# Patient Record
Sex: MALE | Race: WHITE | NOT HISPANIC OR LATINO | URBAN - METROPOLITAN AREA
[De-identification: names, ages, dates, MRNs, and addresses within clinical notes are randomized per-mention and may not be internally consistent; named-entity substitution may affect disease eponyms.]

---

## 2024-10-04 ENCOUNTER — HOSPITAL ENCOUNTER (OUTPATIENT)
Dept: RADIOLOGY | Facility: HOSPITAL | Age: 59
Discharge: HOME | End: 2024-10-04
Attending: OPHTHALMOLOGY
Payer: COMMERCIAL

## 2024-10-04 DIAGNOSIS — H33.311 HORSESHOE RETINAL TEAR OF RIGHT EYE: ICD-10-CM

## 2024-10-04 PROCEDURE — 71046 X-RAY EXAM CHEST 2 VIEWS: CPT | Mod: TC

## 2024-10-10 ENCOUNTER — ANESTHESIA EVENT (OUTPATIENT)
Dept: SURGERY | Facility: HOSPITAL | Age: 59
End: 2024-10-10
Payer: COMMERCIAL

## 2024-10-11 ENCOUNTER — ANESTHESIA (OUTPATIENT)
Dept: SURGERY | Facility: HOSPITAL | Age: 59
End: 2024-10-11
Payer: COMMERCIAL

## 2024-10-11 ENCOUNTER — HOSPITAL ENCOUNTER (OUTPATIENT)
Facility: HOSPITAL | Age: 59
Discharge: HOME | End: 2024-10-11
Attending: OPHTHALMOLOGY | Admitting: OPHTHALMOLOGY
Payer: COMMERCIAL

## 2024-10-11 DIAGNOSIS — H33.319: ICD-10-CM

## 2024-10-11 PROCEDURE — 27201423 OPTIME MED/SURG SUP & DEVICES STERILE SUPPLY: Performed by: OPHTHALMOLOGY

## 2024-10-11 PROCEDURE — 37000008 HC ANESTHESIA 1ST 15 MINUTES: Performed by: OPHTHALMOLOGY

## 2024-10-11 PROCEDURE — 71000015 HC POSTOP RECOV 1ST HR: Performed by: OPHTHALMOLOGY

## 2024-10-11 PROCEDURE — 71000033 HC RECOVERY, INTIAL HOUR: Performed by: OPHTHALMOLOGY

## 2024-10-11 PROCEDURE — 36000706: Performed by: OPHTHALMOLOGY

## 2024-10-11 PROCEDURE — 25000003 PHARM REV CODE 250

## 2024-10-11 PROCEDURE — 36000707: Performed by: OPHTHALMOLOGY

## 2024-10-11 PROCEDURE — 25000003 PHARM REV CODE 250: Performed by: NURSE ANESTHETIST, CERTIFIED REGISTERED

## 2024-10-11 PROCEDURE — 25000003 PHARM REV CODE 250: Performed by: OPHTHALMOLOGY

## 2024-10-11 PROCEDURE — 63600175 PHARM REV CODE 636 W HCPCS: Performed by: NURSE ANESTHETIST, CERTIFIED REGISTERED

## 2024-10-11 PROCEDURE — C1814 RETINAL TAMP, SILICONE OIL: HCPCS | Performed by: OPHTHALMOLOGY

## 2024-10-11 PROCEDURE — 63600175 PHARM REV CODE 636 W HCPCS: Performed by: OPHTHALMOLOGY

## 2024-10-11 PROCEDURE — C1784 OCULAR DEV, INTRAOP, DET RET: HCPCS | Performed by: OPHTHALMOLOGY

## 2024-10-11 PROCEDURE — 37000009 HC ANESTHESIA EA ADD 15 MINS: Performed by: OPHTHALMOLOGY

## 2024-10-11 RX ORDER — ACETAMINOPHEN 500 MG
1000 TABLET ORAL EVERY 4 HOURS PRN
Status: DISCONTINUED | OUTPATIENT
Start: 2024-10-11 | End: 2024-10-11 | Stop reason: HOSPADM

## 2024-10-11 RX ORDER — KETOROLAC TROMETHAMINE 30 MG/ML
30 INJECTION, SOLUTION INTRAMUSCULAR; INTRAVENOUS ONCE
Status: DISCONTINUED | OUTPATIENT
Start: 2024-10-11 | End: 2024-10-11 | Stop reason: HOSPADM

## 2024-10-11 RX ORDER — CYCLOPENTOLATE HYDROCHLORIDE 10 MG/ML
1 SOLUTION/ DROPS OPHTHALMIC
Status: COMPLETED | OUTPATIENT
Start: 2024-10-11 | End: 2024-10-11

## 2024-10-11 RX ORDER — PHENYLEPHRINE HYDROCHLORIDE 25 MG/ML
1 SOLUTION/ DROPS OPHTHALMIC
Status: COMPLETED | OUTPATIENT
Start: 2024-10-11 | End: 2024-10-11

## 2024-10-11 RX ORDER — LIDOCAINE HYDROCHLORIDE 20 MG/ML
INJECTION INTRAVENOUS
Status: DISCONTINUED | OUTPATIENT
Start: 2024-10-11 | End: 2024-10-11

## 2024-10-11 RX ORDER — HYDROCODONE BITARTRATE AND ACETAMINOPHEN 7.5; 325 MG/1; MG/1
1 TABLET ORAL EVERY 4 HOURS PRN
Status: DISCONTINUED | OUTPATIENT
Start: 2024-10-11 | End: 2024-10-11 | Stop reason: HOSPADM

## 2024-10-11 RX ORDER — DEXAMETHASONE SODIUM PHOSPHATE 4 MG/ML
INJECTION, SOLUTION INTRA-ARTICULAR; INTRALESIONAL; INTRAMUSCULAR; INTRAVENOUS; SOFT TISSUE
Status: DISCONTINUED | OUTPATIENT
Start: 2024-10-11 | End: 2024-10-11

## 2024-10-11 RX ORDER — POVIDONE-IODINE 5 %
SOLUTION, NON-ORAL OPHTHALMIC (EYE)
Status: DISCONTINUED | OUTPATIENT
Start: 2024-10-11 | End: 2024-10-11 | Stop reason: HOSPADM

## 2024-10-11 RX ORDER — ONDANSETRON HYDROCHLORIDE 2 MG/ML
INJECTION, SOLUTION INTRAVENOUS
Status: DISCONTINUED | OUTPATIENT
Start: 2024-10-11 | End: 2024-10-11

## 2024-10-11 RX ORDER — KETOROLAC TROMETHAMINE 30 MG/ML
INJECTION, SOLUTION INTRAMUSCULAR; INTRAVENOUS
Status: DISCONTINUED | OUTPATIENT
Start: 2024-10-11 | End: 2024-10-11

## 2024-10-11 RX ORDER — TROPICAMIDE 10 MG/ML
1 SOLUTION/ DROPS OPHTHALMIC
Status: COMPLETED | OUTPATIENT
Start: 2024-10-11 | End: 2024-10-11

## 2024-10-11 RX ORDER — TOBRAMYCIN AND DEXAMETHASONE 3; 1 MG/ML; MG/ML
SUSPENSION/ DROPS OPHTHALMIC
Status: DISCONTINUED | OUTPATIENT
Start: 2024-10-11 | End: 2024-10-11 | Stop reason: HOSPADM

## 2024-10-11 RX ORDER — TRIAMCINOLONE ACETONIDE 40 MG/ML
INJECTION, SUSPENSION INTRA-ARTICULAR; INTRAMUSCULAR
Status: DISCONTINUED | OUTPATIENT
Start: 2024-10-11 | End: 2024-10-11 | Stop reason: HOSPADM

## 2024-10-11 RX ORDER — PROPOFOL 10 MG/ML
INJECTION, EMULSION INTRAVENOUS
Status: DISCONTINUED | OUTPATIENT
Start: 2024-10-11 | End: 2024-10-11

## 2024-10-11 RX ORDER — MIDAZOLAM HYDROCHLORIDE 1 MG/ML
INJECTION INTRAMUSCULAR; INTRAVENOUS
Status: DISCONTINUED | OUTPATIENT
Start: 2024-10-11 | End: 2024-10-11

## 2024-10-11 RX ORDER — ACETAMINOPHEN 500 MG
1000 TABLET ORAL
Status: COMPLETED | OUTPATIENT
Start: 2024-10-11 | End: 2024-10-11

## 2024-10-11 RX ADMIN — CYCLOPENTOLATE HYDROCHLORIDE 1 DROP: 10 SOLUTION/ DROPS OPHTHALMIC at 06:10

## 2024-10-11 RX ADMIN — TROPICAMIDE 1 DROP: 10 SOLUTION/ DROPS OPHTHALMIC at 06:10

## 2024-10-11 RX ADMIN — ONDANSETRON 4 MG: 2 INJECTION INTRAMUSCULAR; INTRAVENOUS at 07:10

## 2024-10-11 RX ADMIN — PROPOFOL 200 MG: 10 INJECTION, EMULSION INTRAVENOUS at 07:10

## 2024-10-11 RX ADMIN — KETOROLAC TROMETHAMINE 30 MG: 30 INJECTION, SOLUTION INTRAMUSCULAR at 08:10

## 2024-10-11 RX ADMIN — SODIUM CHLORIDE: 9 INJECTION, SOLUTION INTRAVENOUS at 07:10

## 2024-10-11 RX ADMIN — LIDOCAINE HYDROCHLORIDE 50 MG: 20 INJECTION, SOLUTION INTRAVENOUS at 07:10

## 2024-10-11 RX ADMIN — DEXAMETHASONE SODIUM PHOSPHATE 4 MG: 4 INJECTION, SOLUTION INTRA-ARTICULAR; INTRALESIONAL; INTRAMUSCULAR; INTRAVENOUS; SOFT TISSUE at 07:10

## 2024-10-11 RX ADMIN — PHENYLEPHRINE HYDROCHLORIDE 1 DROP: 25 SOLUTION/ DROPS OPHTHALMIC at 06:10

## 2024-10-11 RX ADMIN — ACETAMINOPHEN 1000 MG: 500 TABLET ORAL at 06:10

## 2024-10-11 RX ADMIN — MIDAZOLAM 2 MG: 1 INJECTION INTRAMUSCULAR; INTRAVENOUS at 07:10

## 2024-10-11 NOTE — TRANSFER OF CARE
"Anesthesia Transfer of Care Note    Patient: Fareed Jasso    Procedure(s) Performed: Procedure(s) (LRB):  PPV, Laser w/ SF6- OD (Right)    Patient location: PACU    Anesthesia Type: general    Transport from OR: Transported from OR on room air with adequate spontaneous ventilation    Post pain: adequate analgesia    Post assessment: no apparent anesthetic complications    Post vital signs: stable    Level of consciousness: sedated    Nausea/Vomiting: no nausea/vomiting    Complications: none    Transfer of care protocol was followed      Last vitals: Visit Vitals  /89   Pulse 88   Temp 36.5 °C (97.7 °F)   Resp (!) 21   Ht 5' 11" (1.803 m)   Wt 94 kg (207 lb 3.7 oz)   SpO2 99%   BMI 28.90 kg/m²     "

## 2024-10-11 NOTE — ANESTHESIA PROCEDURE NOTES
Intubation    Date/Time: 10/11/2024 7:10 AM    Performed by: Yury Thomson CRNA  Authorized by: Yury Thomson CRNA    Intubation:     Induction:  Intravenous    Intubated:  Postinduction    Mask Ventilation:  Easy mask    Attempts:  1    Attempted By:  CRNA    Difficult Airway Encountered?: No      Complications:  None    Airway Device:  Supraglottic airway/LMA    Airway Device Size:  4.0    Style/Cuff Inflation:  Cuffed (inflated to minimal occlusive pressure)    Secured at:  The lips    Placement Verified By:  Capnometry    Complicating Factors:  None    Findings Post-Intubation:  BS equal bilateral

## 2024-10-11 NOTE — ANESTHESIA PREPROCEDURE EVALUATION
10/10/2024  Fareed Jasso is a 58 y.o., male.    No past medical history on file.     No past surgical history on file.    Pre-op Assessment    I have reviewed the Patient Summary Reports.     I have reviewed the Nursing Notes. I have reviewed the NPO Status.   I have reviewed the Medications.     Review of Systems  Anesthesia Hx:    Suspected respiratory arrest in PACU after lap joana in [place], suspected reaction to phenergan/demerol combination History of prior surgery of interest to airway management or planning:  Previous anesthesia: General        Denies Family Hx of Anesthesia complications.    Denies Personal Hx of Anesthesia complications.                    Renal/:   renal calculi             Denies Other Renal / Gu Conditions   Hepatic/GI:   Hepatic/GI Symptoms: heartburn.              Physical Exam  General: Well nourished, Alert, Oriented and Cooperative    Airway:  Mallampati: II   Mouth Opening: Normal  TM Distance: Normal  Tongue: Normal  Neck ROM: Normal ROM    Dental:  Intact       Latest Reference Range & Units 10/04/24 11:57   WBC 4.50 - 11.50 x10(3)/mcL 5.00   RBC 4.70 - 6.10 x10(6)/mcL 4.71   Hemoglobin 14.0 - 18.0 g/dL 14.9   Hematocrit 42.0 - 52.0 % 43.4   MCV 80.0 - 94.0 fL 92.1   MCH 27.0 - 31.0 pg 31.6 (H)   MCHC 33.0 - 36.0 g/dL 34.3   RDW 11.5 - 17.0 % 12.0   Platelet Count 130 - 400 x10(3)/mcL 191   (H): Data is abnormally high    Anesthesia Plan  Type of Anesthesia, risks & benefits discussed:    Anesthesia Type: Gen Supraglottic Airway  Intra-op Monitoring Plan: Standard ASA Monitors  Post Op Pain Control Plan: multimodal analgesia and IV/PO Opioids PRN  Induction:  IV  Airway Plan: Direct, Post-Induction  Informed Consent: Informed consent signed with the Patient and all parties understand the risks and agree with anesthesia plan.  All questions answered. Patient  consented to blood products? No  ASA Score: 1  Day of Surgery Review of History & Physical: H&P Update referred to the surgeon/provider.I have interviewed and examined the patient. I have reviewed the patient's H&P dated: There are no significant changes.     Ready For Surgery From Anesthesia Perspective.     .

## 2024-10-11 NOTE — DISCHARGE SUMMARY
[facility]                         [address] [address]     PATIENT NAME: Fareed Jasso   YOB: 1965   CSN: [id number]        MRN: [id number]   ADMIT DATE: 10/11/2024   PHYSICIAN:         Ti Elise MD  Discharge Date: 10/11/2024  Discharge Note        Fareed Jasso had eye surgery with Ti Elise on 10/11/2024. Fareed Jasso tolerated the procedure will and is discharged in good condition. Fareed Jasso is discharged to home with follow up with Dr. Elise the following day.

## 2024-10-11 NOTE — ANESTHESIA POSTPROCEDURE EVALUATION
Anesthesia Post Evaluation    Patient: Fareed Jasso    Procedure(s) Performed: Procedure(s) (LRB):  PPV, Laser w/ SF6- OD (Right)    Final Anesthesia Type: general      Patient location during evaluation: PACU  Patient participation: Yes- Able to Participate  Level of consciousness: awake and alert and oriented  Post-procedure vital signs: reviewed and stable  Pain management: adequate  Airway patency: patent  GRAY mitigation strategies: Verification of full reversal of neuromuscular block and Multimodal analgesia  PONV status at discharge: No PONV  Anesthetic complications: no      Cardiovascular status: blood pressure returned to baseline, stable and hemodynamically stable  Respiratory status: unassisted, room air and spontaneous ventilation  Hydration status: euvolemic  Follow-up not needed.              Vitals Value Taken Time   /69 10/11/24 0842   Temp 36.5 °C (97.7 °F) 10/11/24 0833   Pulse 73 10/11/24 0843   Resp 15 10/11/24 0835   SpO2 99 % 10/11/24 0843   Vitals shown include unfiled device data.      No case tracking events are documented in the log.      Pain/Angella Score: Pain Rating Prior to Med Admin: 0 (10/11/2024  6:20 AM)  Angella Score: 7 (10/11/2024  8:40 AM)

## 2024-10-11 NOTE — DISCHARGE INSTRUCTIONS
Scleral Buckle//Vitrectomy  Care After    Refer to this sheet in the next few weeks. These instructions provide you with information on caring for yourself after your procedure. Your caregiver may also give you more specific instructions. Your treatment has been planned according to current medical practices, but problems sometimes occur. Call your caregiver if you have any problems or questions after your procedure.    Home Care Instructions  If you are instructed to stay in a certain position for a certain amount of time, it is important to do so. Positions may include sitting up or lying on either side. The reasons for certain positioning depends on the reason you had the surgery and the type of vitrectomy performed. Ask your caregiver if you have to remain in a certain position for a certain amount of time.    Wear your eye patch for the first day. Dr. Elise will remove the patch the next morning after surgery.  Keep the area around your eye clean and dry.  Put any eyedrops in your eyes as directed by your caregiver after seeing Dr. Elise the day after surgery: TOBRADEX- one drop to affected eye 4 times a day, ex: with breakfast, lunch, dinner, and before bed.    Wear the plastic shield over your eye when you sleep for the first day. This is to protect the eye from being accidentally bumped or jabbed, or having too much pressure put on it.  Avoid any strenuous physical activity for as long as directed by your caregiver. This includes bending over, lifting anything over 5 pounds (2.3 kg), or straining. Talk to your caregiver about when it may be safe to resume sexual activity.  If any of your regular medicines were stopped before surgery, carefully follow your caregiver's instructions about which medicines to restart and when to restart them. You may be instructed not to use blood thinners or aspirin.  Continue with your normal diet unless directed otherwise by your caregiver. If you are diabetic, stay on your  "diabetic diet, or use your insulin as directed by your caregiver.  It is okay to use your other eye for reading and watching television.  Do not drive a car or use contact lenses until your caregiver says it is okay.        Medications:  Tobradex- 1 drop in operative eye, 4 times a day       After surgery instructions:  Maintain the following positional restrictions until cleared by your surgeon: FACEDOWN      Avoid any heavy lifting, strenuous activity, or driving until cleared to do so by your surgeon.  Avoid any "dirty" activities which may lead to eye infection, such as gardening or scrubbing floors.  Do not rub the operative eye.    Notify your surgeon of any:  signs of bleeding or infection  excessive pain or nausea that is not relieved with medication  worsening or sudden loss of vision    SEEK MEDICAL CARE IF:  Your eye becomes very red or painful.  You develop any pus or discharge from the eye.  You have chills.  Your eyelids on either eye become swollen or stuck shut.    SEEK IMMEDIATE MEDICAL CARE IF:    You have a fever.    You notice a change in vision in either eye.    You see more floaters or spots in front of your vision.    Part of your side vision is black and you cannot see through it. It may feel like a shade is being pulled toward the center of your vision from any direction-Leave patch on tonight. Keep it clean and dry.  "

## 2024-10-11 NOTE — OP NOTE
[facility]                         [address] [address]     PATIENT NAME: Fareed Jasso   YOB: 1965   CSN: [id number]        MRN: [id number]   ADMIT DATE: 10/11/2024   PHYSICIAN:         Ti Elise MD    Date of Surgery: 10/11/2024    SURGEON:  Ti Elise MD    PREOPERATIVE DIAGNOSIS:  Macular-off retinal detachment of the right eye.     POSTOPERATIVE DIAGNOSIS:  Macular-off retinal detachment of the right eye.     PROCEDURE:  Pars plana vitrectomy with endo cautery, endolaser, fluid air   exchange and silicone infusion all to the right eye.     ANESTHESIA:  General.     ESTIMATED BLOOD LOSS:  Less than 5 cc.     COMPLICATIONS:  None.     INDICATIONS: PTNAMEAMB@ has a history of a macular-off retinal detachment   of the right eye with evidence of 3 large superior horseshoe tears.     PROCEDURE DESCRIPTION:  The patient was taken to the operative theater where   general anesthesia was begun.  The right eye was prepped and draped in the   normal sterile fashion and a lid speculum was applied.  A standard 3-port   25-gauge pars plana vitrectomy was performed with all trocars being placed 3.5   mm from the surgical limbus.  A core vitrectomy was performed removing the   posterior hyaloid out to the peripheral retina.  The patient was found to have 3   retinal tears.  They were all continuous with one aother in the superior retina along the posterior vitreous base and created a giant retinal tear extending 34 clock hours from 10;00 to 2;00.   All 3   were marked with endocautery. The anterior flap of the tears were amputated with the vitrectomy cutter. Perfluorocarbon liquid was injected into the eye flattening the entire retina.   Endolaser was used to treat the extent of the giant retinal tear. A fluid air exchange was then performed and the retina remained attached with no slippage.   A  silicone oil infusion was then performed  All instruments were removed from the eye and all   sclerotomies were closed with 7.0 vicryl. The conjunctiva was closed with 6.0 gut.   Several drops of TobraDex ophthalmic solution were applied to the eye.  The postoperative   intraocular pressure was 15 mmHg.  The lid speculum and eye drape were then   removed and the eye was covered with a gauze patch and a Olivera shield.  The   patient was then transported to the postoperative care unit to recovery.     DISCHARGE CONDITION:  Good.     DISPOSITION:  Home with followup with Dr. Elise the following day.  This patient   tolerated the procedure well.     Fareed Jasso is discharged to home.        ______________________________  Ti Elise MD

## 2024-10-14 VITALS
OXYGEN SATURATION: 100 % | RESPIRATION RATE: 18 BRPM | BODY MASS INDEX: 29.02 KG/M2 | WEIGHT: 207.25 LBS | TEMPERATURE: 98 F | DIASTOLIC BLOOD PRESSURE: 67 MMHG | SYSTOLIC BLOOD PRESSURE: 122 MMHG | HEIGHT: 71 IN | HEART RATE: 60 BPM

## 2024-12-03 ENCOUNTER — LAB VISIT (OUTPATIENT)
Dept: LAB | Facility: HOSPITAL | Age: 59
End: 2024-12-03
Attending: OPHTHALMOLOGY
Payer: COMMERCIAL

## 2024-12-03 DIAGNOSIS — H43.11 VITREOUS HEMORRHAGE, RIGHT: Primary | ICD-10-CM

## 2024-12-03 DIAGNOSIS — H43.11 VITREOUS HEMORRHAGE, RIGHT: ICD-10-CM

## 2024-12-03 LAB
ANION GAP SERPL CALC-SCNC: 7 MEQ/L
BUN SERPL-MCNC: 13 MG/DL (ref 8.4–25.7)
CALCIUM SERPL-MCNC: 9.2 MG/DL (ref 8.4–10.2)
CHLORIDE SERPL-SCNC: 105 MMOL/L (ref 98–107)
CO2 SERPL-SCNC: 27 MMOL/L (ref 22–29)
CREAT SERPL-MCNC: 1 MG/DL (ref 0.72–1.25)
CREAT/UREA NIT SERPL: 13
GFR SERPLBLD CREATININE-BSD FMLA CKD-EPI: >60 ML/MIN/1.73/M2
GLUCOSE SERPL-MCNC: 121 MG/DL (ref 74–100)
POTASSIUM SERPL-SCNC: 4.3 MMOL/L (ref 3.5–5.1)
SODIUM SERPL-SCNC: 139 MMOL/L (ref 136–145)

## 2024-12-03 PROCEDURE — 36415 COLL VENOUS BLD VENIPUNCTURE: CPT

## 2024-12-11 RX ORDER — CYCLOPENTOLATE HYDROCHLORIDE 10 MG/ML
SOLUTION/ DROPS OPHTHALMIC
COMMUNITY
Start: 2024-10-14

## 2024-12-11 RX ORDER — PREDNISOLONE ACETATE 10 MG/ML
SUSPENSION/ DROPS OPHTHALMIC
COMMUNITY
Start: 2024-11-08

## 2024-12-16 ENCOUNTER — HOSPITAL ENCOUNTER (OUTPATIENT)
Facility: HOSPITAL | Age: 59
Discharge: HOME | End: 2024-12-16
Attending: OPHTHALMOLOGY | Admitting: OPHTHALMOLOGY
Payer: COMMERCIAL

## 2024-12-16 ENCOUNTER — ANESTHESIA (OUTPATIENT)
Facility: HOSPITAL | Age: 59
End: 2024-12-16
Payer: COMMERCIAL

## 2024-12-16 ENCOUNTER — ANESTHESIA EVENT (OUTPATIENT)
Facility: HOSPITAL | Age: 59
End: 2024-12-16
Payer: COMMERCIAL

## 2024-12-16 DIAGNOSIS — H33.20 RETINAL DETACHMENT: ICD-10-CM

## 2024-12-16 PROCEDURE — 25000003 PHARM REV CODE 250

## 2024-12-16 PROCEDURE — 63600175 PHARM REV CODE 636 W HCPCS: Performed by: NURSE ANESTHETIST, CERTIFIED REGISTERED

## 2024-12-16 PROCEDURE — 36000707: Performed by: OPHTHALMOLOGY

## 2024-12-16 PROCEDURE — 36000706: Performed by: OPHTHALMOLOGY

## 2024-12-16 PROCEDURE — D9220A PRA ANESTHESIA: Mod: ANES,,, | Performed by: ANESTHESIOLOGY

## 2024-12-16 PROCEDURE — 25000003 PHARM REV CODE 250: Performed by: ANESTHESIOLOGY

## 2024-12-16 PROCEDURE — 25000003 PHARM REV CODE 250: Performed by: OPHTHALMOLOGY

## 2024-12-16 PROCEDURE — 63600175 PHARM REV CODE 636 W HCPCS: Performed by: ANESTHESIOLOGY

## 2024-12-16 PROCEDURE — 71000015 HC POSTOP RECOV 1ST HR: Performed by: OPHTHALMOLOGY

## 2024-12-16 PROCEDURE — 71000033 HC RECOVERY, INTIAL HOUR: Performed by: OPHTHALMOLOGY

## 2024-12-16 PROCEDURE — 27201423 OPTIME MED/SURG SUP & DEVICES STERILE SUPPLY: Performed by: OPHTHALMOLOGY

## 2024-12-16 PROCEDURE — 37000009 HC ANESTHESIA EA ADD 15 MINS: Performed by: OPHTHALMOLOGY

## 2024-12-16 PROCEDURE — D9220A PRA ANESTHESIA: Mod: CRNA,,, | Performed by: NURSE ANESTHETIST, CERTIFIED REGISTERED

## 2024-12-16 PROCEDURE — 37000008 HC ANESTHESIA 1ST 15 MINUTES: Performed by: OPHTHALMOLOGY

## 2024-12-16 RX ORDER — CYCLOPENTOLATE HYDROCHLORIDE 10 MG/ML
1 SOLUTION/ DROPS OPHTHALMIC
Status: COMPLETED | OUTPATIENT
Start: 2024-12-16 | End: 2024-12-16

## 2024-12-16 RX ORDER — IPRATROPIUM BROMIDE AND ALBUTEROL SULFATE 2.5; .5 MG/3ML; MG/3ML
3 SOLUTION RESPIRATORY (INHALATION) ONCE AS NEEDED
Status: DISCONTINUED | OUTPATIENT
Start: 2024-12-16 | End: 2024-12-16 | Stop reason: HOSPADM

## 2024-12-16 RX ORDER — ONDANSETRON HYDROCHLORIDE 2 MG/ML
4 INJECTION, SOLUTION INTRAVENOUS ONCE
Status: COMPLETED | OUTPATIENT
Start: 2024-12-16 | End: 2024-12-16

## 2024-12-16 RX ORDER — TOBRAMYCIN AND DEXAMETHASONE 3; 1 MG/ML; MG/ML
SUSPENSION/ DROPS OPHTHALMIC
Status: DISCONTINUED | OUTPATIENT
Start: 2024-12-16 | End: 2024-12-16 | Stop reason: HOSPADM

## 2024-12-16 RX ORDER — ONDANSETRON HYDROCHLORIDE 2 MG/ML
INJECTION, SOLUTION INTRAVENOUS
Status: DISCONTINUED
Start: 2024-12-16 | End: 2024-12-16 | Stop reason: HOSPADM

## 2024-12-16 RX ORDER — ACETAMINOPHEN 500 MG
1000 TABLET ORAL ONCE
Status: COMPLETED | OUTPATIENT
Start: 2024-12-16 | End: 2024-12-16

## 2024-12-16 RX ORDER — HYDROCODONE BITARTRATE AND ACETAMINOPHEN 7.5; 325 MG/1; MG/1
TABLET ORAL
Status: COMPLETED
Start: 2024-12-16 | End: 2024-12-16

## 2024-12-16 RX ORDER — DIPHENHYDRAMINE HYDROCHLORIDE 50 MG/ML
25 INJECTION INTRAMUSCULAR; INTRAVENOUS EVERY 6 HOURS PRN
Status: DISCONTINUED | OUTPATIENT
Start: 2024-12-16 | End: 2024-12-16 | Stop reason: HOSPADM

## 2024-12-16 RX ORDER — KETOROLAC TROMETHAMINE 30 MG/ML
INJECTION, SOLUTION INTRAMUSCULAR; INTRAVENOUS
Status: DISCONTINUED | OUTPATIENT
Start: 2024-12-16 | End: 2024-12-16

## 2024-12-16 RX ORDER — PHENYLEPHRINE HYDROCHLORIDE 25 MG/ML
1 SOLUTION/ DROPS OPHTHALMIC
Status: COMPLETED | OUTPATIENT
Start: 2024-12-16 | End: 2024-12-16

## 2024-12-16 RX ORDER — HYDROMORPHONE HYDROCHLORIDE 2 MG/ML
0.2 INJECTION, SOLUTION INTRAMUSCULAR; INTRAVENOUS; SUBCUTANEOUS EVERY 5 MIN PRN
Status: DISCONTINUED | OUTPATIENT
Start: 2024-12-16 | End: 2024-12-16 | Stop reason: HOSPADM

## 2024-12-16 RX ORDER — PROPOFOL 10 MG/ML
VIAL (ML) INTRAVENOUS
Status: DISCONTINUED | OUTPATIENT
Start: 2024-12-16 | End: 2024-12-16

## 2024-12-16 RX ORDER — SODIUM CHLORIDE 9 MG/ML
INJECTION, SOLUTION INTRAVENOUS CONTINUOUS
Status: DISCONTINUED | OUTPATIENT
Start: 2024-12-16 | End: 2024-12-16 | Stop reason: HOSPADM

## 2024-12-16 RX ORDER — ONDANSETRON HYDROCHLORIDE 2 MG/ML
INJECTION, SOLUTION INTRAMUSCULAR; INTRAVENOUS
Status: DISCONTINUED | OUTPATIENT
Start: 2024-12-16 | End: 2024-12-16

## 2024-12-16 RX ORDER — DEXAMETHASONE SODIUM PHOSPHATE 4 MG/ML
INJECTION, SOLUTION INTRA-ARTICULAR; INTRALESIONAL; INTRAMUSCULAR; INTRAVENOUS; SOFT TISSUE
Status: DISCONTINUED | OUTPATIENT
Start: 2024-12-16 | End: 2024-12-16

## 2024-12-16 RX ORDER — HYDROCODONE BITARTRATE AND ACETAMINOPHEN 5; 325 MG/1; MG/1
1 TABLET ORAL
Status: DISCONTINUED | OUTPATIENT
Start: 2024-12-16 | End: 2024-12-16 | Stop reason: HOSPADM

## 2024-12-16 RX ORDER — TROPICAMIDE 10 MG/ML
1 SOLUTION/ DROPS OPHTHALMIC
Status: COMPLETED | OUTPATIENT
Start: 2024-12-16 | End: 2024-12-16

## 2024-12-16 RX ORDER — FENTANYL CITRATE 50 UG/ML
INJECTION, SOLUTION INTRAMUSCULAR; INTRAVENOUS
Status: DISCONTINUED | OUTPATIENT
Start: 2024-12-16 | End: 2024-12-16

## 2024-12-16 RX ORDER — LIDOCAINE HYDROCHLORIDE 10 MG/ML
INJECTION, SOLUTION EPIDURAL; INFILTRATION; INTRACAUDAL; PERINEURAL
Status: DISCONTINUED | OUTPATIENT
Start: 2024-12-16 | End: 2024-12-16

## 2024-12-16 RX ORDER — LIDOCAINE HYDROCHLORIDE 10 MG/ML
1 INJECTION, SOLUTION EPIDURAL; INFILTRATION; INTRACAUDAL; PERINEURAL ONCE
Status: DISCONTINUED | OUTPATIENT
Start: 2024-12-16 | End: 2024-12-16 | Stop reason: HOSPADM

## 2024-12-16 RX ORDER — HYDROMORPHONE HYDROCHLORIDE 2 MG/ML
INJECTION, SOLUTION INTRAMUSCULAR; INTRAVENOUS; SUBCUTANEOUS
Status: DISCONTINUED
Start: 2024-12-16 | End: 2024-12-16 | Stop reason: HOSPADM

## 2024-12-16 RX ORDER — METOCLOPRAMIDE HYDROCHLORIDE 5 MG/ML
10 INJECTION INTRAMUSCULAR; INTRAVENOUS EVERY 10 MIN PRN
Status: DISCONTINUED | OUTPATIENT
Start: 2024-12-16 | End: 2024-12-16 | Stop reason: HOSPADM

## 2024-12-16 RX ORDER — HYDROMORPHONE HYDROCHLORIDE 2 MG/ML
0.5 INJECTION, SOLUTION INTRAMUSCULAR; INTRAVENOUS; SUBCUTANEOUS EVERY 5 MIN PRN
Status: DISCONTINUED | OUTPATIENT
Start: 2024-12-16 | End: 2024-12-16 | Stop reason: HOSPADM

## 2024-12-16 RX ADMIN — PHENYLEPHRINE HYDROCHLORIDE 1 DROP: 25 SOLUTION/ DROPS OPHTHALMIC at 10:12

## 2024-12-16 RX ADMIN — CYCLOPENTOLATE HYDROCHLORIDE 1 DROP: 10 SOLUTION/ DROPS OPHTHALMIC at 10:12

## 2024-12-16 RX ADMIN — ONDANSETRON 4 MG: 2 INJECTION INTRAMUSCULAR; INTRAVENOUS at 12:12

## 2024-12-16 RX ADMIN — DEXAMETHASONE SODIUM PHOSPHATE 4 MG: 4 INJECTION, SOLUTION INTRA-ARTICULAR; INTRALESIONAL; INTRAMUSCULAR; INTRAVENOUS; SOFT TISSUE at 11:12

## 2024-12-16 RX ADMIN — PROPOFOL 200 MG: 10 INJECTION, EMULSION INTRAVENOUS at 11:12

## 2024-12-16 RX ADMIN — HYDROMORPHONE HYDROCHLORIDE 0.5 MG: 2 INJECTION INTRAMUSCULAR; INTRAVENOUS; SUBCUTANEOUS at 12:12

## 2024-12-16 RX ADMIN — HYDROCODONE BITARTRATE AND ACETAMINOPHEN 1 TABLET: 7.5; 325 TABLET ORAL at 01:12

## 2024-12-16 RX ADMIN — ACETAMINOPHEN 1000 MG: 500 TABLET ORAL at 10:12

## 2024-12-16 RX ADMIN — TROPICAMIDE 1 DROP: 10 SOLUTION/ DROPS OPHTHALMIC at 10:12

## 2024-12-16 RX ADMIN — ONDANSETRON HYDROCHLORIDE 4 MG: 2 SOLUTION INTRAMUSCULAR; INTRAVENOUS at 11:12

## 2024-12-16 RX ADMIN — FENTANYL CITRATE 50 MCG: 50 INJECTION, SOLUTION INTRAMUSCULAR; INTRAVENOUS at 11:12

## 2024-12-16 RX ADMIN — SODIUM CHLORIDE: 9 INJECTION, SOLUTION INTRAVENOUS at 11:12

## 2024-12-16 RX ADMIN — HYDROMORPHONE HYDROCHLORIDE 0.2 MG: 2 INJECTION INTRAMUSCULAR; INTRAVENOUS; SUBCUTANEOUS at 12:12

## 2024-12-16 RX ADMIN — LIDOCAINE HYDROCHLORIDE 20 MG: 10 INJECTION, SOLUTION EPIDURAL; INFILTRATION; INTRACAUDAL; PERINEURAL at 11:12

## 2024-12-16 RX ADMIN — KETOROLAC TROMETHAMINE 30 MG: 30 INJECTION, SOLUTION INTRAMUSCULAR at 11:12

## 2024-12-16 NOTE — OP NOTE
[facility]                         [address] [address]     PATIENT NAME: Fareed Jasso   YOB: 1965   CSN: [id number]        MRN: [id number]   ADMIT DATE: 12/16/2024     PHYSICIAN:         Ti Elise MD                           OPERATIVE REPORT     DATE OF SURGERY:12/16/2024       SURGEON:  Ti Elise MD     PREOPERATIVE DIAGNOSIS:  Retinal Detachment Right Eye     POSTOPERATIVE DIAGNOSIS:  Retinal Detachment Right Eye    PROCEDURE:  Pars plana vitrectomy with silicone oil removal right eye.     ANESTHESIA:  General.     ESTIMATED BLOOD LOSS:  Less than 5 cc.     COMPLICATIONS:  None.     PROCEDURE INDICATIONS: Fareed Jasso  has a history of a Retinal Detachment Right Eye previously repaired with PPV with Silicone Oil. Now needs Silicone Oil removal.    PROCEDURE DESCRIPTION:  The patient was taken to the operative theater, where   general anesthesia was begun.  The right eye was prepped and draped in the   normal sterile fashion, and a lid speculum was applied.  A 2-port 25-gauge pars   plana vitrectomy was performed.  A 25-gauge trocar was placed inferotemporally   3.5 mm from the surgical limbus.  A superior temporal sclerotomy was created   with an MVR blade 3.5 mm from the surgical limbus.  The silicon oil was   extracted with a silicon oil extraction handpiece without complication.  The   retina was then examined, and there were no retinal breaks identified.  The   superior temporal sclerotomy was closed with 7-0 Vicryl suture, and its   conjunctiva was closed with 6-0 fast-absorbing gut.  The inferior temporal   trocar was removed, and its sclerotomy was massaged closed with a cotton tip   swab.  The postoperative intraocular pressure was 15 mmHg.  Several drops of   TobraDex ophthalmic solution were applied to the eye.  The lid speculum and the   eye drape were then removed, and  the eye was covered with a gauze patch and a   Olivera shield.  The patient was then transported to the postoperative care unit to   recover.     DISCHARGE CONDITION:  Good.     DISPOSITION:  Home, with followup with Dr. Elise the following day.       This patient tolerated the procedure well.     Fareed Jasso is discharged to home.           ______________________________  Ti Elise MD

## 2024-12-16 NOTE — DISCHARGE INSTRUCTIONS
Retinal Surgery  Care After    Refer to this sheet in the next few weeks. These instructions provide you with information on caring for yourself after your procedure. Your caregiver may also give you more specific instructions. Your treatment has been planned according to current medical practices, but problems sometimes occur. Call your caregiver if you have any problems or questions after your procedure.    Home Care Instructions  If you are instructed to stay in a certain position for a certain amount of time, it is important to do so. Positions may include sitting up or lying on either side. The reasons for certain positioning depends on the reason you had the surgery and the type of vitrectomy performed. Ask your caregiver if you have to remain in a certain position for a certain amount of time.    Wear your eye patch for the first day. Dr. Elise will remove the patch the next morning after surgery.   Keep the area around your eye clean and dry.  Wear the plastic shield over your eye when you sleep for the first day. This is to protect the eye from being accidentally bumped or jabbed, or having too much pressure put on it.  Avoid any strenuous physical activity for as long as directed by your caregiver. This includes bending over, lifting anything over 5 pounds (2.3 kg), or straining. Talk to your caregiver about when it may be safe to resume sexual activity.  If any of your regular medicines were stopped before surgery, carefully follow your caregiver's instructions about which medicines to restart and when to restart them. You may be instructed not to use blood thinners or aspirin.  Continue with your normal diet unless directed otherwise by your caregiver. If you are diabetic, stay on your diabetic diet, or use your insulin as directed by your caregiver.  It is okay to use your other eye for reading and watching television.  Do not drive a car or use contact lenses until your caregiver says it is okay.   "    Dr. Elise will remove patch the morning after surgery and will begin the following medication as instructed.    Medications:  Tobradex- 1 drop in operative eye, 4 times a day. **Dr. Elise will provide these eye drops at your follow up appointment.           Can also use prescribed Hydrocodone with acetaminophen (Norco) for pain as needed.  Take with food to prevent nausea.  Can have Norco at 5 pm.     Can alterate ever 3-4 hours with Tylenol - you may take next dose, if needed for pain, at 5:05 pm.   Do not take tylenol and Norco at the exact same hour.      Can alternate with ibuprofen/Advil for pain.   Can have ibuprofen at 5:45 pm       After surgery instructions:  Maintain the following positional restrictions until cleared by your surgeon:  Do not lay flat on back.        Avoid any heavy lifting, strenuous activity, or driving until cleared to do so by your surgeon.  Avoid any "dirty" activities which may lead to eye infection, such as gardening or scrubbing floors.  Do not rub the operative eye.    Notify your surgeon of any:  signs of bleeding or infection  excessive pain or nausea that is not relieved with medication  worsening or sudden loss of vision    SEEK MEDICAL CARE IF:  Your eye becomes very red or painful.  You develop any pus or discharge from the eye.  You have chills.  Your eyelids on either eye become swollen or stuck shut.    SEEK IMMEDIATE MEDICAL CARE IF:    You have a fever.    You notice a change in vision in either eye.    You see more floaters or spots in front of your vision.    Part of your side vision is black and you cannot see through it. It may feel like a shade is being pulled toward the center of your vision from any direction-Leave patch on tonight. Keep it clean and dry.  "

## 2024-12-16 NOTE — TRANSFER OF CARE
"Anesthesia Transfer of Care Note    Patient: Fareed Jasso    Procedure(s) Performed: Procedure(s) (LRB):  VITRECTOMY, PARS PLANA APPROACH    /////right eye; PPV; Oil Removal (Right)    Patient location: PACU    Anesthesia Type: general    Transport from OR: Transported from OR on room air with adequate spontaneous ventilation    Post pain: adequate analgesia    Post assessment: no apparent anesthetic complications    Post vital signs: stable    Level of consciousness: sedated    Nausea/Vomiting: no nausea/vomiting    Complications: none    Transfer of care protocol was followed      Last vitals: Visit Vitals  /73   Pulse 69   Temp 36.6 °C (97.8 °F) (Oral)   Ht 5' 11" (1.803 m)   Wt 88.5 kg (195 lb)   SpO2 96%   BMI 27.20 kg/m²     "

## 2024-12-16 NOTE — ANESTHESIA POSTPROCEDURE EVALUATION
Anesthesia Post Evaluation    Patient: Fareed Jasso    Procedure(s) Performed: Procedure(s) (LRB):  VITRECTOMY, PARS PLANA APPROACH    /////right eye; PPV; Oil Removal (Right)    Final Anesthesia Type: general      Patient location during evaluation: PACU  Patient participation: Yes- Able to Participate  Level of consciousness: awake and alert  Post-procedure vital signs: reviewed and stable  Pain management: adequate  Airway patency: patent  GRAY mitigation strategies: Multimodal analgesia  PONV status at discharge: No PONV  Anesthetic complications: no      Cardiovascular status: hemodynamically stable  Respiratory status: unassisted  Hydration status: euvolemic  Follow-up not needed.              Vitals Value Taken Time   /90 12/16/24 1221   Temp 36.1 °C (97 °F) 12/16/24 1159   Pulse 78 12/16/24 1225   Resp 16 12/16/24 1219   SpO2 99 % 12/16/24 1225   Vitals shown include unfiled device data.      No case tracking events are documented in the log.      Pain/Angella Score: Pain Rating Prior to Med Admin: 7 (12/16/2024 12:19 PM)  Angella Score: 4 (12/16/2024 11:59 AM)

## 2024-12-16 NOTE — DISCHARGE SUMMARY
[facility]                         [address] [address]     PATIENT NAME: Fareed Jasso   YOB: 1965   CSN: [id number]        MRN: [id number]   ADMIT DATE: 12/16/2024   PHYSICIAN:         Ti Elise MD  Discharge Date: 12/16/2024  Discharge Note        Fareed Jasso had eye surgery with Ti Elise on 12/16/2024. Fareed Jasso tolerated the procedure will and is discharged in good condition. Fareed Jasso is discharged to home with follow up with Dr. Elise the following day.

## 2024-12-16 NOTE — ANESTHESIA PROCEDURE NOTES
Intubation    Date/Time: 12/16/2024 11:22 AM    Performed by: Kristen Olivera CRNA  Authorized by: Earl Greenfield DO    Intubation:     Induction:  Intravenous    Intubated:  Postinduction    Mask Ventilation:  Easy mask    Attempts:  1    Attempted By:  CRNA    Difficult Airway Encountered?: No      Airway Device:  Supraglottic airway/LMA    Airway Device Size:  4.0    Style/Cuff Inflation:  Cuffed (inflated to minimal occlusive pressure)    Inflation Amount (mL):  18    Placement Verified By:  Capnometry    Complicating Factors:  None    Findings Post-Intubation:  BS equal bilateral

## 2024-12-16 NOTE — ANESTHESIA PREPROCEDURE EVALUATION
12/16/2024  Fareed Jasso is a 59 y.o., male presenting for Vitrectomy.    Other Medical History   Urinary stone Peptic ulcer   GRAY (obstructive sleep apnea)      Surgical History    CHOLECYSTECTOMY REPAIR OF RETINAL DETACHMENT WITH VITRECTOMY   LEG SURGERY ESOPHAGOGASTRODUODENOSCOPY         H/H: 14/43  PLT: 191  EKG: NSR    Pre-op Assessment    I have reviewed the Patient Summary Reports.     I have reviewed the Nursing Notes. I have reviewed the NPO Status.   I have reviewed the Medications.     Review of Systems  Anesthesia Hx:  No problems with previous Anesthesia                Social:  Non-Smoker       Pulmonary:        Sleep Apnea, CPAP                Hepatic/GI:   PUD,                      Physical Exam  General: Well nourished, Cooperative, Alert and Oriented    Airway:  Mallampati: III   Mouth Opening: Normal  TM Distance: Normal  Tongue: Normal  Neck ROM: Normal ROM    Dental:  Intact    Chest/Lungs:  Clear to auscultation, Normal Respiratory Rate    Heart:  Rate: Normal  Rhythm: Regular Rhythm  Sounds: Normal    Abdomen:  Normal, Soft, Nontender        Anesthesia Plan  Type of Anesthesia, risks & benefits discussed:    Anesthesia Type: Gen Supraglottic Airway  Intra-op Monitoring Plan: Standard ASA Monitors  Post Op Pain Control Plan: multimodal analgesia  Induction:  IV  Airway Plan: Direct  Informed Consent: Informed consent signed with the Patient and all parties understand the risks and agree with anesthesia plan.  All questions answered.   ASA Score: 3  Day of Surgery Review of History & Physical: H&P Update referred to the surgeon/provider.    Ready For Surgery From Anesthesia Perspective.     .

## 2024-12-17 VITALS
HEART RATE: 68 BPM | RESPIRATION RATE: 16 BRPM | TEMPERATURE: 97 F | BODY MASS INDEX: 27.3 KG/M2 | SYSTOLIC BLOOD PRESSURE: 127 MMHG | DIASTOLIC BLOOD PRESSURE: 76 MMHG | HEIGHT: 71 IN | OXYGEN SATURATION: 98 % | WEIGHT: 195 LBS

## 2025-04-23 ENCOUNTER — HOSPITAL ENCOUNTER (OUTPATIENT)
Facility: HOSPITAL | Age: 60
Discharge: HOME | End: 2025-04-23
Attending: OPHTHALMOLOGY | Admitting: OPHTHALMOLOGY
Payer: COMMERCIAL

## 2025-04-23 ENCOUNTER — ANESTHESIA (OUTPATIENT)
Facility: HOSPITAL | Age: 60
End: 2025-04-23
Payer: COMMERCIAL

## 2025-04-23 ENCOUNTER — ANESTHESIA EVENT (OUTPATIENT)
Facility: HOSPITAL | Age: 60
End: 2025-04-23
Payer: COMMERCIAL

## 2025-04-23 VITALS
WEIGHT: 200 LBS | HEIGHT: 71 IN | OXYGEN SATURATION: 99 % | HEART RATE: 69 BPM | TEMPERATURE: 98 F | DIASTOLIC BLOOD PRESSURE: 75 MMHG | BODY MASS INDEX: 28 KG/M2 | SYSTOLIC BLOOD PRESSURE: 112 MMHG

## 2025-04-23 DIAGNOSIS — H26.9 CATARACT: ICD-10-CM

## 2025-04-23 PROBLEM — H25.11 AGE-RELATED NUCLEAR CATARACT OF RIGHT EYE: Status: ACTIVE | Noted: 2025-04-23

## 2025-04-23 PROCEDURE — 36000706: Performed by: OPHTHALMOLOGY

## 2025-04-23 PROCEDURE — 37000009 HC ANESTHESIA EA ADD 15 MINS: Performed by: OPHTHALMOLOGY

## 2025-04-23 PROCEDURE — 37000008 HC ANESTHESIA 1ST 15 MINUTES: Performed by: OPHTHALMOLOGY

## 2025-04-23 PROCEDURE — 63600175 PHARM REV CODE 636 W HCPCS: Performed by: OPHTHALMOLOGY

## 2025-04-23 PROCEDURE — 27201423 OPTIME MED/SURG SUP & DEVICES STERILE SUPPLY: Performed by: OPHTHALMOLOGY

## 2025-04-23 PROCEDURE — 63600175 PHARM REV CODE 636 W HCPCS: Performed by: ANESTHESIOLOGY

## 2025-04-23 PROCEDURE — 25000003 PHARM REV CODE 250: Performed by: OPHTHALMOLOGY

## 2025-04-23 PROCEDURE — 25000003 PHARM REV CODE 250

## 2025-04-23 PROCEDURE — 63600175 PHARM REV CODE 636 W HCPCS: Performed by: NURSE ANESTHETIST, CERTIFIED REGISTERED

## 2025-04-23 PROCEDURE — 36000707: Performed by: OPHTHALMOLOGY

## 2025-04-23 PROCEDURE — 71000015 HC POSTOP RECOV 1ST HR: Performed by: OPHTHALMOLOGY

## 2025-04-23 PROCEDURE — V2632 POST CHMBR INTRAOCULAR LENS: HCPCS | Performed by: OPHTHALMOLOGY

## 2025-04-23 DEVICE — TECNIS 1-PC CLEAR MONO 6.0MM 19.5D
Type: IMPLANTABLE DEVICE | Site: EYE | Status: FUNCTIONAL
Brand: TECNIS IOL

## 2025-04-23 RX ORDER — CYCLOPENTOLATE HYDROCHLORIDE 10 MG/ML
1 SOLUTION/ DROPS OPHTHALMIC EVERY 5 MIN PRN
Status: DISCONTINUED | OUTPATIENT
Start: 2025-04-23 | End: 2025-04-23 | Stop reason: HOSPADM

## 2025-04-23 RX ORDER — EPINEPHRINE 1 MG/ML
INJECTION, SOLUTION, CONCENTRATE INTRAVENOUS
Status: DISCONTINUED | OUTPATIENT
Start: 2025-04-23 | End: 2025-04-23 | Stop reason: HOSPADM

## 2025-04-23 RX ORDER — SODIUM CHLORIDE 0.9 % (FLUSH) 0.9 %
10 SYRINGE (ML) INJECTION
Status: DISCONTINUED | OUTPATIENT
Start: 2025-04-23 | End: 2025-04-23 | Stop reason: HOSPADM

## 2025-04-23 RX ORDER — TROPICAMIDE 10 MG/ML
1 SOLUTION/ DROPS OPHTHALMIC
Status: COMPLETED | OUTPATIENT
Start: 2025-04-23 | End: 2025-04-23

## 2025-04-23 RX ORDER — MOXIFLOXACIN 5 MG/ML
SOLUTION/ DROPS OPHTHALMIC
Status: DISCONTINUED | OUTPATIENT
Start: 2025-04-23 | End: 2025-04-23 | Stop reason: HOSPADM

## 2025-04-23 RX ORDER — MOXIFLOXACIN 5 MG/ML
1 SOLUTION/ DROPS OPHTHALMIC
Status: COMPLETED | OUTPATIENT
Start: 2025-04-23 | End: 2025-04-23

## 2025-04-23 RX ORDER — PHENYLEPHRINE HYDROCHLORIDE 100 MG/ML
1 SOLUTION/ DROPS OPHTHALMIC
Status: COMPLETED | OUTPATIENT
Start: 2025-04-23 | End: 2025-04-23

## 2025-04-23 RX ORDER — FENTANYL CITRATE 50 UG/ML
INJECTION, SOLUTION INTRAMUSCULAR; INTRAVENOUS
Status: DISCONTINUED | OUTPATIENT
Start: 2025-04-23 | End: 2025-04-23

## 2025-04-23 RX ORDER — BUPIVACAINE HYDROCHLORIDE 7.5 MG/ML
0.1 INJECTION, SOLUTION EPIDURAL; RETROBULBAR
Status: COMPLETED | OUTPATIENT
Start: 2025-04-23 | End: 2025-04-23

## 2025-04-23 RX ORDER — ACETAMINOPHEN 500 MG
1000 TABLET ORAL EVERY 6 HOURS PRN
Status: DISCONTINUED | OUTPATIENT
Start: 2025-04-23 | End: 2025-04-23 | Stop reason: HOSPADM

## 2025-04-23 RX ORDER — LIDOCAINE HYDROCHLORIDE 10 MG/ML
INJECTION, SOLUTION EPIDURAL; INFILTRATION; INTRACAUDAL; PERINEURAL
Status: DISCONTINUED | OUTPATIENT
Start: 2025-04-23 | End: 2025-04-23 | Stop reason: HOSPADM

## 2025-04-23 RX ORDER — ONDANSETRON HYDROCHLORIDE 2 MG/ML
INJECTION, SOLUTION INTRAVENOUS
Status: DISCONTINUED | OUTPATIENT
Start: 2025-04-23 | End: 2025-04-23

## 2025-04-23 RX ORDER — ACETAMINOPHEN 500 MG
TABLET ORAL
Status: DISCONTINUED
Start: 2025-04-23 | End: 2025-04-23 | Stop reason: HOSPADM

## 2025-04-23 RX ORDER — BUPIVACAINE HYDROCHLORIDE 7.5 MG/ML
INJECTION, SOLUTION EPIDURAL; RETROBULBAR
Status: DISCONTINUED | OUTPATIENT
Start: 2025-04-23 | End: 2025-04-23 | Stop reason: HOSPADM

## 2025-04-23 RX ORDER — POVIDONE-IODINE 5 %
SOLUTION, NON-ORAL OPHTHALMIC (EYE)
Status: DISCONTINUED | OUTPATIENT
Start: 2025-04-23 | End: 2025-04-23 | Stop reason: HOSPADM

## 2025-04-23 RX ORDER — MIDAZOLAM HYDROCHLORIDE 1 MG/ML
INJECTION INTRAMUSCULAR; INTRAVENOUS
Status: DISCONTINUED | OUTPATIENT
Start: 2025-04-23 | End: 2025-04-23

## 2025-04-23 RX ORDER — MIDAZOLAM HYDROCHLORIDE 2 MG/2ML
2 INJECTION, SOLUTION INTRAMUSCULAR; INTRAVENOUS ONCE AS NEEDED
Status: COMPLETED | OUTPATIENT
Start: 2025-04-23 | End: 2025-04-23

## 2025-04-23 RX ORDER — LIDOCAINE HYDROCHLORIDE 10 MG/ML
1 INJECTION, SOLUTION EPIDURAL; INFILTRATION; INTRACAUDAL; PERINEURAL ONCE
Status: DISCONTINUED | OUTPATIENT
Start: 2025-04-23 | End: 2025-04-23 | Stop reason: HOSPADM

## 2025-04-23 RX ADMIN — BUPIVACAINE HYDROCHLORIDE 0.75 MG: 7.5 INJECTION, SOLUTION EPIDURAL; RETROBULBAR at 09:04

## 2025-04-23 RX ADMIN — PHENYLEPHRINE HYDROCHLORIDE 1 DROP: 100 SOLUTION/ DROPS OPHTHALMIC at 09:04

## 2025-04-23 RX ADMIN — TROPICAMIDE 1 DROP: 10 SOLUTION/ DROPS OPHTHALMIC at 09:04

## 2025-04-23 RX ADMIN — MIDAZOLAM HYDROCHLORIDE 2 MG: 1 INJECTION, SOLUTION INTRAMUSCULAR; INTRAVENOUS at 09:04

## 2025-04-23 RX ADMIN — ONDANSETRON HYDROCHLORIDE 4 MG: 2 SOLUTION INTRAMUSCULAR; INTRAVENOUS at 10:04

## 2025-04-23 RX ADMIN — FENTANYL CITRATE 50 MCG: 50 INJECTION INTRAMUSCULAR; INTRAVENOUS at 10:04

## 2025-04-23 RX ADMIN — MIDAZOLAM HYDROCHLORIDE 2 MG: 1 INJECTION, SOLUTION INTRAMUSCULAR; INTRAVENOUS at 10:04

## 2025-04-23 RX ADMIN — ACETAMINOPHEN 1000 MG: 500 TABLET ORAL at 11:04

## 2025-04-23 RX ADMIN — MOXIFLOXACIN 1 DROP: 5 SOLUTION/ DROPS OPHTHALMIC at 09:04

## 2025-04-23 NOTE — DISCHARGE SUMMARY
[facility] 2nd Floor  Discharge Note  Short Stay    Procedure(s) (LRB):  PHACOEMULSIFICATION, CATARACT, WITH IOL INSERTION     /////right eye (Right)  OUTCOME: Patient tolerated treatment/procedure well without complication and is now ready for discharge.    DISPOSITION: Home or Self Care    FINAL DIAGNOSIS:  Pseudophakia Z96.1    FOLLOWUP: Tomorrow @ Dr. Hines's Office    DISCHARGE INSTRUCTIONS:   To Recovery and when criteria met discharge to home  Surgery: Refer to operative note  IV: D/C per anesthesia   Diet: Advance as tolerated  Activity: light duty, no outside work, no lifting, sleep with shield  Meds: Follow post-op instruction sheet provided (antibiotic, steroid, & anti-inflammatory gtts per Dr. Hines)   Resume all medicine from home

## 2025-04-23 NOTE — ANESTHESIA POSTPROCEDURE EVALUATION
Anesthesia Post Evaluation    Patient: Fareed Jasso    Procedure(s) Performed: Procedure(s) (LRB):  PHACOEMULSIFICATION, CATARACT, WITH IOL INSERTION     /////right eye (Right)    Final Anesthesia Type: MAC      Patient location during evaluation: OPS  Patient participation: Yes- Able to Participate  Level of consciousness: awake and alert  Post-procedure vital signs: reviewed and stable  Pain management: adequate  Airway patency: patent  GRAY mitigation strategies: Multimodal analgesia  PONV status at discharge: No PONV  Anesthetic complications: no      Cardiovascular status: blood pressure returned to baseline  Respiratory status: unassisted, spontaneous ventilation and room air  Hydration status: euvolemic  Follow-up not needed.                  No case tracking events are documented in the log.      Pain/Angella Score: No data recorded

## 2025-04-23 NOTE — TRANSFER OF CARE
Anesthesia Transfer of Care Note    Patient: Fareed Jasso    Procedure(s) Performed: Procedure(s) (LRB):  PHACOEMULSIFICATION, CATARACT, WITH IOL INSERTION     /////right eye (Right)    Patient location: OPS    Anesthesia Type: MAC    Transport from OR: Transported from OR on room air with adequate spontaneous ventilation    Post pain: adequate analgesia    Post assessment: no apparent anesthetic complications and tolerated procedure well    Post vital signs: stable    Level of consciousness: awake, alert and oriented    Nausea/Vomiting: no nausea/vomiting    Complications: none    Transfer of care protocol was followed      Last vitals:

## 2025-04-23 NOTE — ANESTHESIA PREPROCEDURE EVALUATION
04/23/2025  Fareed Jasso is a 59 y.o., male with   -------------------------------------    General anesthetics causing adverse effect in therapeutic use    Pt has woken up during surgery before. Pt states he had respiratory arrest in PACU after lap joana in [place]. Believes it was a reaction to phenergan/demerol combination    GRAY (obstructive sleep apnea)    uses cpap    Peptic ulcer    Urinary stone       And   ----------------------------    Cholecystectomy    Esophagogastroduodenoscopy    ulcers removed from esophagus    Leg surgery    Liver stone removal    Pt states he had liver stones removed multiple (12)    Repair of retinal detachment with vitrectomy    Procedure: PPV, Laser w/ SF6- OD;  Surgeon: Ti Elise MD;  Location: [facility] OR;  Service: Ophthalmology;  Laterality: Right;    Vitrectomy by pars plana approach    Procedure: VITRECTOMY, PARS PLANA APPROACH    /////right eye; PPV; Oil Removal;  Surgeon: Ti Elise MD;  Location: [address] OR;  Service: Ophthalmology;  Laterality: Right;     Presents for right eye phaco - pt has GRAY and had hx of oversedation 20+ years ago for an ankle procedure    Had trauma to his eye in past with difficutly in depth perception    Two previous LMA procedures for vitrectomy in Oct and Dec of 2024    Explained difference between sedation procedures as this vs general anesthesia       Pre-op Assessment    I have reviewed the NPO Status.      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     Pulmonary:        Sleep Apnea     Obstructive Sleep Apnea (GRAY).           Hepatic/GI:   PUD,           Peptic Ulcer Disease              Physical Exam  General: Well nourished, Cooperative, Alert and Oriented    Airway:  Mallampati: II   Mouth Opening: Normal  TM Distance: < 4 cm  Tongue: Normal  Neck ROM: Normal ROM  Recessed  chin  Dental:  Intact    Chest/Lungs:  Clear to auscultation, Normal Respiratory Rate    Heart:  Rate: Normal  Rhythm: Regular Rhythm        Anesthesia Plan  Type of Anesthesia, risks & benefits discussed:    Anesthesia Type: MAC  Intra-op Monitoring Plan: Standard ASA Monitors  Post Op Pain Control Plan:   (medical reason for not using multimodal pain management)  Induction:  IV  Airway Plan: , Awake  Informed Consent: Informed consent signed with the Patient and all parties understand the risks and agree with anesthesia plan.  All questions answered.   ASA Score: 3  Day of Surgery Review of History & Physical: H&P Update referred to the surgeon/provider.  Anesthesia Plan Notes: Nasal Cannula vs O2 Via Facemask  Will give IV versed in holding then additional prior to procedure start    Ready For Surgery From Anesthesia Perspective.     .

## 2025-04-23 NOTE — OP NOTE
Date of Procedure: 4/23/2025     Procedure: Procedure(s) (LRB):  PHACOEMULSIFICATION, CATARACT, WITH IOL INSERTION     /////right eye (Right)     Surgeons and Role:     * Rashard Hines II, MD - Primary    Pre-Operative Diagnosis: Nuclear sclerotic cataract of right eye [H25.11]    Post-Operative Diagnosis: Post-Op Diagnosis Codes:     * Nuclear sclerotic cataract of right eye [H25.11]    Anesthesia: Monitor Anesthesia Care    Operative Findings (including complications, if any): None    Description of Technical Procedures:     Dilating drops were given in the holding area. The patient was brought into the surgical suite, identified and correct eye confirmed. Topical anesthesia was applied. The eye was then prepped and draped in a  sterile fashion. A supersharp blade was used to make a paracentesis at the 10 o'clock position. 1/2 CC of 2% Lidocaine + 1/2 CC BSS was injected into AC thru cannula. Viscoelastic was placed in the anterior chamber. A clear corneal incision was made at the 9 o'clock position with a keratome blade. Next, a cystotome and utrata forceps were used to make a 360 degree capsulorrhexis. BSS thru a cannula was used to hydro dissect and rotate the lens material from the capsule. The phaco handpiece was placed into the anterior chamber and the lens was removed in a divide an conquer fashion. The remaining cortex was removed with the I & A handpiece. Viscoelastic was placed into the capsular bag, which remained clear and intact. An IOL was placed in the bag and rotated into position. The remaining viscoelastic was removed with the I&A handpiece. The incision was hydrated with BSS and checked for leakage. No leakage was found. The drapes were removed and antibiotic drops were placed into the eye. The patient tolerated the procedure well and was moved back to the holding room. Sunglasses and instructions were given to the patient and family. The patient will follow-up at my office tomorrow.    EFX:  11.7  Lens: 19.5 [id number]

## (undated) DEVICE — BETADINE OPTHALMIC SOL 5% 30ML

## (undated) DEVICE — BENZOIN TINCTURE 0.66ML

## (undated) DEVICE — CARTRIDGE SHOOTER

## (undated) DEVICE — CATH IV CATHLON W/HUB18GAX

## (undated) DEVICE — PACK VFI SILICONE OIL SYRINGE

## (undated) DEVICE — SYR 3ML LL 18GA 1.5IN

## (undated) DEVICE — SYR TB DETACH NDL ST 25G 5/8IN

## (undated) DEVICE — SUPPORT ULNA NERVE PROTECTOR

## (undated) DEVICE — GLOVE SENSICARE PI MICRO 8

## (undated) DEVICE — PACK EYE LEONI DISPOSABLE

## (undated) DEVICE — KNIFE MVR STRAIGHT TIP 20GA

## (undated) DEVICE — SPONGE COTTON TRAY 4X4IN

## (undated) DEVICE — Device

## (undated) DEVICE — SUT 7/0 18IN COATED VICRYL

## (undated) DEVICE — BAG RECLOSABLE 2MIL 4X6IN

## (undated) DEVICE — ADAPTER DUAL NSL LUER M-M 7FT

## (undated) DEVICE — SET BIOM OPTIC HD LENS 175MM

## (undated) DEVICE — DRAPE MICROSCOPE HEAD COVER

## (undated) DEVICE — NDL BLUNT W/O FILTER 18GX1.5IN

## (undated) DEVICE — TIP PHACO 30 DEG BEVEL 21GA

## (undated) DEVICE — SOL BSS BALANCED SALT

## (undated) DEVICE — POSITIONER HEAD ADULT

## (undated) DEVICE — CANNULA SOFT TIP 25G

## (undated) DEVICE — ILLUMINATED CURVED LASERPROBE

## (undated) DEVICE — GLOVE SENSICARE PI MICRO 6

## (undated) DEVICE — GOWN POLY REINF BRTH SLV XL

## (undated) DEVICE — OIL SILICONE

## (undated) DEVICE — TIP I & A

## (undated) DEVICE — KIT PERFLUOROCARBON LIQUID

## (undated) DEVICE — TAPE CURAD PAPER 1INX1.5YD

## (undated) DEVICE — SPONGE WEC CEL SPEARS

## (undated) DEVICE — PACK EVA VITRECTOMY INPUT 25G

## (undated) DEVICE — GLOVE SENSICARE PI MICRO 7.5

## (undated) DEVICE — SUT 6/0 18IN PLAIN GUT D/A

## (undated) DEVICE — SOL BALANCED SALT 500ML

## (undated) DEVICE — PACK CASSETTE TUBE ELLIPS FX